# Patient Record
(demographics unavailable — no encounter records)

---

## 2025-01-03 NOTE — PROCEDURE
[Transvaginal OB Sonogram] : Transvaginal OB Sonogram [Current GA by Sonogram: ___ (wks)] : Current GA by Sonogram: [unfilled]Uwks [Intrauterine Pregnancy] : no intrauterine pregnancy [Yolk Sac] : no yolk sac [Fetal Heart] : no fetal heart [FreeTextEntry1] : US c/w pregnancy of unknown location. Intrauterine gestational sac seen but no yolk sac. No clear intrauterine or extrauterine pregnancy identified. No free fluid or other evidence of rupturing ectopic pregnancy.

## 2025-01-03 NOTE — HISTORY OF PRESENT ILLNESS
[Y] : Patient is sexually active [N] : Patient denies prior pregnancies [Regular Cycle Intervals] : periods have been regular [Frequency: Q ___ days] : menstrual periods occur approximately every [unfilled] days [Menarche Age: ____] : age at menarche was [unfilled] [Currently Active] : currently active [Men] : men [No] : No [Patient reported mammogram was normal] : Patient reported mammogram was normal [Patient reported breast sonogram was normal] : Patient reported breast sonogram was normal [Patient reported PAP Smear was abnormal] : Patient reported PAP Smear was abnormal [Mammogramdate] : 06/2024 [BreastSonogramDate] : 06/2024 [PapSmeardate] : 02/2023 [TextBox_31] : HPV+ [LMPDate] : 11/30/24 [MensesFreq] : 26 [MensesLength] : 5-6 [FreeTextEntry1] : 11/30/24

## 2025-01-03 NOTE — PHYSICAL EXAM
[Chaperone Present] : A chaperone was present in the examining room during all aspects of the physical examination [02806] : A chaperone was present during the pelvic exam. [Appropriately responsive] : appropriately responsive [Alert] : alert [No Acute Distress] : no acute distress [Soft] : soft [Non-tender] : non-tender [Non-distended] : non-distended [Oriented x3] : oriented x3 [Labia Majora] : normal [Labia Minora] : normal [Normal] : normal [Uterine Adnexae] : normal [FreeTextEntry2] : Anai Barrett [FreeTextEntry4] : Clinically well perfused [FreeTextEntry5] : No inc work of breathing

## 2025-01-03 NOTE — PLAN
[FreeTextEntry1] : 36 yo G0 at 4w6d based on LMP who presents for pregnancy confirmation, noted to have pregnancy of unknown location on ultrasound today. Benign exam, VSS, asymptomatic in clinic. No e/o rupturing ectopic at this time. - Discussed PUL diagnosis and possibilities of early IUP vs SAB vs ectopic pregnancy. - bhcg, T&S, CBC, CMP today - Ectopic/bleeding ED precautions reviewed - Confirmed pts contact number and sister is emergency contact [phone] - Continue prenatal vitamin  #HCM - Pap today - Pt to send mammogram record  RTC on monday for repeat bhcg. Pt provided with on call info for practice. Pt aware to contact immediately and go to ED if has severe abdominal pain or bleeding. Pt in agreement with plan, all questions answered.

## 2025-01-07 NOTE — PHYSICAL EXAM
[Chaperone Present] : A chaperone was present in the examining room during all aspects of the physical examination [14834] : A chaperone was present during the pelvic exam. [FreeTextEntry2] : Mary mayes present for TVUS [Appropriately responsive] : appropriately responsive [Alert] : alert [No Acute Distress] : no acute distress

## 2025-01-07 NOTE — PROCEDURE
[Transvaginal OB Sonogram] : Transvaginal OB Sonogram [Intrauterine Pregnancy] : intrauterine pregnancy [Yolk Sac] : yolk sac present [Fetal Heart] : no fetal heart [FreeTextEntry1] : Intrauterine pregnancy noted with GS and YS. GS 1.52 cm 6w2d - not consistent with LMP

## 2025-01-07 NOTE — PLAN
[FreeTextEntry1] : 36 yo G0 at 5w3d by LMP presenting for follow up for pregnancy of unknown location. Intrauterine pregnancy noted today although dating not c/w LMP. Given high initial hcg at time that PUL diagnosed, pt given referral to [facility] for dating confirmation of for better assessment of adnexa.

## 2025-01-07 NOTE — HISTORY OF PRESENT ILLNESS
[FreeTextEntry1] : Pt is a 38 yo G0 at 5w3d by LMP presenting for follow up for pregnancy of unknown location. On 1/3 visit a GS was noted but no YS. [facility] [place] [id number].   Pt reports mild intermittent cramping pain but no bleeding and no severe abdominal pain. Bhcg from yesterday 19761.

## 2025-04-28 NOTE — HISTORY OF PRESENT ILLNESS
[Home] : at home, [unfilled] , at the time of the visit. [Medical Office: ([facility])___] : at the medical office located in  [Telehealth (audio & video)] : This visit was provided via telehealth using real-time 2-way audio visual technology. [Verbal consent obtained from patient] : the patient, [unfilled] [FreeTextEntry1] : Chief complaint: varicella   Ms. Maldonado is a 36 yo  at 20 6/7wga presenting for [facility] consultation.   The patient's self reported ethnicity is black. She works in [employer] . Her male partner is also described as black. She has completed carrier screening however her partner does not live in the [place] and cannot get tested.   She is originally from [place] and never had the varicella vaccine. The patient was recently diagnosed with varicella. She was treated with acyclovir and her symptoms are now resolved.   PMH: denies  PSH: denies Gyn:  abnormal pap  HPV pos Family:  Breast cancer in mother age 46. Per pt mammogram wnl 1 yr ago. There is no family history of birth defects, intellectual disability, genetic conditions, or stillbirths.  Meds: PNV NKDA

## 2025-04-28 NOTE — DISCUSSION/SUMMARY
[FreeTextEntry1] :  Varicella in pregnancy: Maternal varicella infection not only has important implications for the mother's health, but also for her fetus. Transmission can occur in utero, perinatally, or postnatally. Intrauterine or  infection of the fetus is facilitated through transplacental transmission while  varicella is transmitted through respiratory droplets or direct contact with someone with varicella. The precise mechanism of in utero VZV infection is unknown. It is widely accepted that maternal viremia leads to placental infection with subsequent fetal infection. The sites of VZV replication in the fetus are unclear. It has been suggested that the fetus develops varicella in utero followed by resolution and subsequent infection of the dorsal root ganglia. This results in cell destruction in nerve tissue, which may account for limb denervation changes seen in the congenital varicella syndrome. Congenital varicella syndrome  is characterized by the following findings: cutaneous scars in a dermatomal pattern, neurological abnormalities (eg, intellectual disability, microcephaly, hydrocephalus, seizures, Molly's syndrome), ocular abnormalities (eg, optic nerve atrophy, cataracts, chorioretinitis, microphthalmos, nystagmus), limb abnormalities (hypoplasia, atrophy, paresis), gastrointestinal abnormalities (gastroesophageal reflux, atretic or stenotic bowel), and low birth weight. The patient is currently remote from delivery and was treated with acyclovir. After maternal infection, the risk of congenital varicella syndrome can be estimated using polymerase chain reaction (PCR) testing of fetal blood or amniotic fluid for VZV DNA in conjunction with ultrasonography for detection of fetal abnormalities. The patient declines invasive testing. Plan for continued US surveillance.

## 2025-04-28 NOTE — HISTORY OF PRESENT ILLNESS
[Home] : at home, [unfilled] , at the time of the visit. [Medical Office: ([facility])___] : at the medical office located in  [Telehealth (audio & video)] : This visit was provided via telehealth using real-time 2-way audio visual technology. [Verbal consent obtained from patient] : the patient, [unfilled] [FreeTextEntry1] : Chief complaint: varicella   Ms. Maldonado is a 36 yo  at 20 6/7wga presenting for [facility] consultation.   The patient's self reported ethnicity is black. She works in [employer] . Her male partner is also described as black. She has completed carrier screening however her partner does not live in the [place] and cannot get tested.   She is originally from [place] and never had the varicella vaccine. The patient was recently diagnosed with varicella. She was treated with acyclovir and her symptoms are now resolved.   PMH: denies  PSH: denies Gyn:  abnormal pap  HPV pos Family:  Breast cancer in mother age 46. Per pt mammogram wnl 1 yr ago. There is no family history of birth defects, intellectual disability, genetic conditions, or stillbirths.  Meds: PNV NKDA